# Patient Record
Sex: MALE | Race: WHITE | NOT HISPANIC OR LATINO | Employment: OTHER | URBAN - METROPOLITAN AREA
[De-identification: names, ages, dates, MRNs, and addresses within clinical notes are randomized per-mention and may not be internally consistent; named-entity substitution may affect disease eponyms.]

---

## 2022-11-08 PROBLEM — I63.9 CEREBRAL INFARCTION (HCC): Status: ACTIVE | Noted: 2020-11-12

## 2022-11-08 PROBLEM — I10 ESSENTIAL HYPERTENSION: Status: ACTIVE | Noted: 2020-11-12

## 2022-11-08 PROBLEM — I77.9 CAROTID ARTERY DISEASE (HCC): Status: ACTIVE | Noted: 2020-11-12

## 2022-11-08 PROBLEM — E78.5 DYSLIPIDEMIA: Status: ACTIVE | Noted: 2020-11-12

## 2022-11-08 PROBLEM — G20.A1 PARKINSON'S DISEASE (HCC): Status: ACTIVE | Noted: 2020-08-10

## 2022-11-08 PROBLEM — M10.9 GOUT: Status: ACTIVE | Noted: 2020-11-12

## 2022-11-08 PROBLEM — I63.9 CEREBRAL INFARCTION (HCC): Status: RESOLVED | Noted: 2020-11-12 | Resolved: 2022-11-08

## 2024-08-13 ENCOUNTER — PATIENT MESSAGE (OUTPATIENT)
Dept: HEALTH INFORMATION MANAGEMENT | Facility: OTHER | Age: 64
End: 2024-08-13

## 2024-09-24 ENCOUNTER — TELEPHONE (OUTPATIENT)
Dept: HEALTH INFORMATION MANAGEMENT | Facility: OTHER | Age: 64
End: 2024-09-24

## 2024-11-17 ENCOUNTER — APPOINTMENT (OUTPATIENT)
Dept: RADIOLOGY | Facility: IMAGING CENTER | Age: 64
End: 2024-11-17
Attending: NURSE PRACTITIONER
Payer: COMMERCIAL

## 2024-11-17 ENCOUNTER — OFFICE VISIT (OUTPATIENT)
Dept: URGENT CARE | Facility: CLINIC | Age: 64
End: 2024-11-17
Payer: COMMERCIAL

## 2024-11-17 VITALS
DIASTOLIC BLOOD PRESSURE: 72 MMHG | RESPIRATION RATE: 16 BRPM | OXYGEN SATURATION: 97 % | TEMPERATURE: 97.9 F | SYSTOLIC BLOOD PRESSURE: 126 MMHG | BODY MASS INDEX: 27.28 KG/M2 | HEART RATE: 78 BPM | HEIGHT: 68 IN | WEIGHT: 180 LBS

## 2024-11-17 DIAGNOSIS — S62.521A CLOSED DISPLACED FRACTURE OF DISTAL PHALANX OF RIGHT THUMB, INITIAL ENCOUNTER: ICD-10-CM

## 2024-11-17 DIAGNOSIS — S60.10XA SUBUNGUAL HEMATOMA OF FINGER OF RIGHT HAND, INITIAL ENCOUNTER: ICD-10-CM

## 2024-11-17 DIAGNOSIS — S69.90XA THUMB INJURY, INITIAL ENCOUNTER: ICD-10-CM

## 2024-11-17 DIAGNOSIS — S60.311A: ICD-10-CM

## 2024-11-17 PROCEDURE — 73140 X-RAY EXAM OF FINGER(S): CPT | Mod: TC,RT | Performed by: NURSE PRACTITIONER

## 2024-11-17 PROCEDURE — 99203 OFFICE O/P NEW LOW 30 MIN: CPT | Performed by: NURSE PRACTITIONER

## 2024-11-17 PROCEDURE — 3078F DIAST BP <80 MM HG: CPT | Performed by: NURSE PRACTITIONER

## 2024-11-17 PROCEDURE — 3074F SYST BP LT 130 MM HG: CPT | Performed by: NURSE PRACTITIONER

## 2024-11-17 RX ORDER — LOSARTAN POTASSIUM 50 MG/1
50 TABLET ORAL DAILY
COMMUNITY

## 2024-11-17 RX ORDER — POLYETHYLENE GLYCOL-3350 AND ELECTROLYTES 236; 6.74; 5.86; 2.97; 22.74 G/274.31G; G/274.31G; G/274.31G; G/274.31G; G/274.31G
POWDER, FOR SOLUTION ORAL
COMMUNITY
Start: 2024-10-11

## 2024-11-17 RX ORDER — DONEPEZIL HYDROCHLORIDE 5 MG/1
5 TABLET, FILM COATED ORAL
COMMUNITY
Start: 2024-11-13 | End: 2025-11-13

## 2024-11-17 RX ORDER — CEPHALEXIN 500 MG/1
500 CAPSULE ORAL 4 TIMES DAILY
Qty: 20 CAPSULE | Refills: 0 | Status: SHIPPED | OUTPATIENT
Start: 2024-11-17 | End: 2024-11-22

## 2024-11-17 RX ORDER — POLYETHYLENE GLYCOL 3350 17 G/17G
17 POWDER, FOR SOLUTION ORAL
COMMUNITY

## 2024-11-17 RX ORDER — NAPROXEN 500 MG/1
TABLET ORAL
COMMUNITY
Start: 2024-10-30 | End: 2024-11-21

## 2024-11-17 ASSESSMENT — ENCOUNTER SYMPTOMS
TINGLING: 0
FOCAL WEAKNESS: 0
SENSORY CHANGE: 1
MYALGIAS: 0

## 2024-11-17 ASSESSMENT — FIBROSIS 4 INDEX: FIB4 SCORE: 0.74

## 2024-11-17 NOTE — PROGRESS NOTES
You want to figure out what it know because that the symptom Subjective     Jake LAUREN is a 64 y.o. male who presents with Other (Patient coming in for possible fracture of r thumb, swollen, redness, purple x 1 day)            HPI  New problem.  Patient is a very pleasant 64-year-old male who presents with right thumb injury after getting it caught on his ladder up to his attic.  This accident happened yesterday afternoon.  Since that time he has had increased swelling, bruising, and mild pain to the area.  He does have a mild abrasion to the dorsal aspect of the thumb.  He has a very small subungual hematoma as well.  He does state some numbness but denies any focal weakness.  He has been taking naproxen with good relief of his pain.  He is right-hand dominant.    Patient has no known allergies.  Current Outpatient Medications on File Prior to Visit   Medication Sig Dispense Refill    losartan (COZAAR) 50 MG Tab Take 50 mg by mouth every day.      Cyanocobalamin (VITAMIN B-12) 5000 MCG SL Tab Place 5,000 mcg under the tongue every day.      donepezil (ARICEPT) 5 MG Tab Take 5 mg by mouth.      Salicylic Acid 3 % Shampoo Apply  topically.      polyethylene glycol 3350 (MIRALAX) 17 GM/SCOOP Powder Take 17 g by mouth.      GAVILYTE-G 236 g Recon Soln       naproxen (NAPROSYN) 500 MG Tab Take  by mouth.      tamsulosin (FLOMAX) 0.4 MG capsule Take 1 Capsule by mouth every evening. 90 Capsule 3    rosuvastatin (CRESTOR) 5 MG Tab Take 1 Tablet by mouth every evening. 90 Tablet 3    valsartan (DIOVAN) 40 MG Tab Take 1 Tablet by mouth every day. 90 Tablet 3    metronidazole (METROGEL) 0.75 % gel       finasteride (PROSCAR) 5 MG Tab 1 Tablet every day.      Cobalamin Combinations (B-12) 100-5000 MCG SL Tab Place  under the tongue.      Cholecalciferol 2000 UNIT Tab Take 1 Tablet by mouth.      carbidopa-levodopa (SINEMET)  MG Tab Take 1 Tablet by mouth 3 times a day.       No current facility-administered  "medications on file prior to visit.     Social History     Socioeconomic History    Marital status:      Spouse name: Not on file    Number of children: Not on file    Years of education: Not on file    Highest education level: Not on file   Occupational History    Not on file   Tobacco Use    Smoking status: Never    Smokeless tobacco: Never   Substance and Sexual Activity    Alcohol use: Yes    Drug use: No    Sexual activity: Not on file   Other Topics Concern    Not on file   Social History Narrative    Not on file     Social Drivers of Health     Financial Resource Strain: Not on file   Food Insecurity: Not on file   Transportation Needs: Not on file   Physical Activity: Not on file   Stress: Not on file   Social Connections: Not on file   Intimate Partner Violence: Low Risk  (12/30/2022)    Received from [facility]    History of Abuse     History of Abuse: Denies   Housing Stability: Not on file     Breast Cancer-related family history is not on file.      Review of Systems   Musculoskeletal:  Positive for joint pain. Negative for myalgias.   Skin:         +bruising. Small abrasion.   Neurological:  Positive for sensory change. Negative for tingling and focal weakness.   All other systems reviewed and are negative.             Objective     /72   Pulse 78   Temp 36.6 °C (97.9 °F)   Resp 16   Ht 1.715 m (5' 7.5\")   Wt 81.6 kg (180 lb)   SpO2 97%   BMI 27.78 kg/m²      Physical Exam  Constitutional:       Appearance: Normal appearance. He is not ill-appearing.   Cardiovascular:      Rate and Rhythm: Normal rate and regular rhythm.      Heart sounds: No murmur heard.  Pulmonary:      Effort: Pulmonary effort is normal.      Breath sounds: Normal breath sounds.   Musculoskeletal:      Right hand: Swelling, tenderness and bony tenderness present. Decreased range of motion. Decreased strength. Normal capillary refill.   Skin:     General: Skin is warm and dry.      Findings: " Bruising present.      Comments: Small subungal hematoma   Neurological:      General: No focal deficit present.      Mental Status: He is alert and oriented to person, place, and time.   Psychiatric:         Mood and Affect: Mood normal.     I told him is that if you need anything stronger need to let me know so we can                        Assessment & Plan        Assessment & Plan  Closed displaced fracture of distal phalanx of right thumb, initial encounter    Orders:    cephALEXin (KEFLEX) 500 MG Cap; Take 1 Capsule by mouth 4 times a day for 5 days.  patient placed in orthoglass thumb spica splint.    Thumb injury, initial encounter    Orders:    DX-FINGER(S) 2+ LEFT; Future    Referral to Hand Surgery    Subungual hematoma of finger of right hand, initial encounter  Patient declines stronger pain medication.  Referral to hand surgery- displaced, comminuted fracture of distal phalanx. NVS intact.  Keflex- abrasion near fracture site.  He will reach out if there is a need for stronger medication.       Abrasion of skin of right thumb